# Patient Record
Sex: FEMALE | Race: BLACK OR AFRICAN AMERICAN | Employment: OTHER | URBAN - METROPOLITAN AREA
[De-identification: names, ages, dates, MRNs, and addresses within clinical notes are randomized per-mention and may not be internally consistent; named-entity substitution may affect disease eponyms.]

---

## 2019-05-07 ENCOUNTER — APPOINTMENT (OUTPATIENT)
Dept: GENERAL RADIOLOGY | Age: 69
End: 2019-05-07
Attending: ANESTHESIOLOGY
Payer: MEDICARE

## 2019-05-07 ENCOUNTER — HOSPITAL ENCOUNTER (OUTPATIENT)
Age: 69
Setting detail: OUTPATIENT SURGERY
Discharge: HOME | End: 2019-05-07
Attending: ANESTHESIOLOGY | Admitting: ANESTHESIOLOGY
Payer: MEDICARE

## 2019-05-07 VITALS
OXYGEN SATURATION: 93 % | BODY MASS INDEX: 36.48 KG/M2 | TEMPERATURE: 98.5 F | WEIGHT: 226 LBS | HEART RATE: 72 BPM | RESPIRATION RATE: 16 BRPM | SYSTOLIC BLOOD PRESSURE: 130 MMHG | DIASTOLIC BLOOD PRESSURE: 64 MMHG

## 2019-05-07 PROCEDURE — 76010000154 HC OR TIME FIRST 0.5 HR: Performed by: ANESTHESIOLOGY

## 2019-05-07 PROCEDURE — 77030018846 HC SOL IRR STRL H20 ICUM -A: Performed by: ANESTHESIOLOGY

## 2019-05-07 PROCEDURE — 74011000250 HC RX REV CODE- 250: Performed by: ANESTHESIOLOGY

## 2019-05-07 PROCEDURE — 77030014125 HC TY EPDRL BBMI -B: Performed by: ANESTHESIOLOGY

## 2019-05-07 PROCEDURE — 76210000063 HC OR PH I REC FIRST 0.5 HR: Performed by: ANESTHESIOLOGY

## 2019-05-07 PROCEDURE — 74011250636 HC RX REV CODE- 250/636

## 2019-05-07 PROCEDURE — 76210000020 HC REC RM PH II FIRST 0.5 HR: Performed by: ANESTHESIOLOGY

## 2019-05-07 PROCEDURE — 74011250636 HC RX REV CODE- 250/636: Performed by: ANESTHESIOLOGY

## 2019-05-07 PROCEDURE — 77030037529: Performed by: ANESTHESIOLOGY

## 2019-05-07 RX ORDER — FENTANYL CITRATE 50 UG/ML
INJECTION, SOLUTION INTRAMUSCULAR; INTRAVENOUS AS NEEDED
Status: DISCONTINUED | OUTPATIENT
Start: 2019-05-07 | End: 2019-05-07 | Stop reason: HOSPADM

## 2019-05-07 RX ORDER — MIDAZOLAM HYDROCHLORIDE 1 MG/ML
INJECTION, SOLUTION INTRAMUSCULAR; INTRAVENOUS AS NEEDED
Status: DISCONTINUED | OUTPATIENT
Start: 2019-05-07 | End: 2019-05-07 | Stop reason: HOSPADM

## 2019-05-07 RX ORDER — LIDOCAINE HYDROCHLORIDE 10 MG/ML
INJECTION INFILTRATION; PERINEURAL AS NEEDED
Status: DISCONTINUED | OUTPATIENT
Start: 2019-05-07 | End: 2019-05-07 | Stop reason: HOSPADM

## 2019-05-07 RX ORDER — LIDOCAINE HYDROCHLORIDE AND EPINEPHRINE 10; 10 MG/ML; UG/ML
INJECTION, SOLUTION INFILTRATION; PERINEURAL AS NEEDED
Status: DISCONTINUED | OUTPATIENT
Start: 2019-05-07 | End: 2019-05-07 | Stop reason: HOSPADM

## 2019-05-07 NOTE — BRIEF OP NOTE
BRIEF OPERATIVE NOTE Date of Procedure: 5/7/2019 Preoperative Diagnosis: Left knee pain, unspecified chronicity [M25.562] Postoperative Diagnosis: Left knee pain, unspecified chronicity [M25.562] Procedure(s): LEFT KNEE COOLIEF Surgeon(s) and Role: 
   * Rhea Scheuermann, MD - Primary Surgical Assistant: none Surgical Staff: 
Circ-1: Bora Melgar RN Local Nurse Monitor: Ness Booker RN Radiology Technician: Tommy Nguyen RT, R, CT Scrub Tech-1: Antonio Villalobos Event Time In Time Out Incision Start 9023 Incision Close 4343 Anesthesia: Local  
Estimated Blood Loss: none Specimens: * No specimens in log * Findings: none Complications: none Implants: * No implants in log *

## 2019-05-07 NOTE — H&P
[address] [place] [address] 
HISTORY AND PHYSICAL Name:  Isabel Veliz 
MR#:  [id number] :  1950 ACCOUNT #:  [de-identified] ADMIT DATE:  2019 HISTORY OF PRESENT ILLNESS:  The patient is a very pleasant 73-year-old female with severe left knee pain. She underwent diagnostic genicular nerve blocks to the left knee 2 weeks ago with excellent results. MRI shows severe tricompartmental osteoarthritis. I planned COOLIEF to the genicular nerves of her left knee tomorrow at [place] [address]. PAST MEDICAL HISTORY: 
1. Hypertension. 2.  Chronic anxiety disorder. PAST SURGICAL HISTORY: 
1. Cholecystectomy. 2.  Appendectomy. 3.  History of GI surgery. CURRENT MEDICATIONS: 
1.  Mobic. 2.  Xanax. 3.  Probiotics. 4.  Losartan. 5.  Meclizine. 6.  Vitamin D. ALLERGIES: 
1. FENTANYL. 2.  OXYCODONE. 3.  IVP DYE. 4.  REGLAN. SOCIAL HISTORY: Denies smoking. Denies alcohol abuse. FAMILY HISTORY:  Noncontributory. REVIEW OF SYSTEMS:  Noncontributory. PHYSICAL EXAMINATION: 
GENERAL APPEARANCE:  A very pleasant middle-aged female sitting in the chair, in no acute distress. Afebrile. VITAL SIGNS:  Stable. CHEST:  Clear. HEART:  Regular rate and rhythm. ABDOMEN:  Benign. EXTREMITIES:  No clubbing, cyanosis, or edema. MUSCULOSKELETAL:  Reveals some tenderness on manipulation of the left knee. NEUROLOGIC:  Exam was grossly normal. 
 
SUMMARY:  This is a very pleasant 73-year-old female with severe left knee pain who presents for COOLIEF to the genicular nerves of her left knee at [place] [address]. ASSESSMENT:  Chronic left knee pain due to severe degenerative joint disease. PLAN:  The patient will be taken to the operating room tomorrow for COOLIEF to the genicular nerves of her left knee at [place] [address]. Jaun Espinal MD 
 
 
[id number] 
D:  2019 21:12 
T:  2019 21:16 
JOB #:  [id number]

## 2019-05-07 NOTE — OP NOTES
[address] [facility]  OPERATIVE REPORT    Name:  Isabel Veliz  MR#:  [id number]  :  1950  ACCOUNT #:  [de-identified]  DATE OF SERVICE:  2019    PREOPERATIVE DIAGNOSIS:  Severe left knee pain due to degenerative joint disease. POSTOPERATIVE DIAGNOSIS:  Severe left knee pain due to degenerative joint disease. PROCEDURE PERFORMED:  1.  COOLIEF to the left superior medial genicular nerve. 2.  COOLIEF to the left superior lateral genicular nerve. 3.  COOLIEF to the left inferior medial genicular nerve. 4.  Fluoroscopy. 5.  IV sedation. SURGEON:  Timoteo Johnson MD    ASSISTANT:  none    ANESTHESIA:  Local with IV sedation. COMPLICATIONS:  None. SPECIMENS REMOVED: none    IMPLANTS:  none    ESTIMATED BLOOD LOSS:  None. FLUIDS:  None. CONDITION:  Stable. INDICATIONS:  Ms. Vanna Crockett is a wonderfully pleasant 51-year-old female with severe left knee pain. She presents today for COOLIEF to the genicular nerves of her left knee at [place] [address]. PROCEDURE:  After informed consent was obtained, a 22-gauge IV was placed in the right arm and the patient was taken to the operating room and positioned supine. Monitors were applied. Vital signs were stable. Left knee was prepped and draped in the usual sterile fashion and a small skin wheal was made over the left superior medial aspect of the knee using 1% lidocaine. Next, a 7.5 cm radiofrequency needle with a 5-mm active tip was advanced until contact was made with bone. The needle was advanced off the bone to a distance MCC the diaphysis of the distal femur. Views were confirmed in AP and lateral fluoroscopy. Sensory stimulation at 50 Hz up to 3 volts produced pain at around 1 volt. Motor stimulation at 2 Hz up to 3 volts produced no movement of the left leg or foot. Next 2 mL of 1% lidocaine were injected.   After 2 minutes, radiofrequency lesioning was carried out at 60 degrees Celsius for 2 minutes and 30 seconds. This same procedure was repeated over the left superior lateral aspect of the knee and the left inferior medial aspect of the knee. At each level, sensory stimulation at 50 Hz up to 3 volts produced pain at less than 2 volts and motor stimulation at 2 Hz up to 3 volts produced no movement of the left leg or foot. At each level, radiofrequency lesioning was carried out at 60 degrees Celsius for 2 minutes and 30 seconds. She tolerated the entire procedure well. There were no complications. All fluoroscopic views were interpreted by me. She was transferred to the recovery room in satisfactory condition. PLAN:  I recommended continuing her current pain medications. I will see her back in 4 weeks for followup in my office.       Darwin Gilbert MD      [id number]  D:  05/07/2019 9:31  T:  05/07/2019 9:36  JOB #:  [id number]

## 2019-05-07 NOTE — DISCHARGE INSTRUCTIONS
Pain Management Aftercare    Common Side Effects that may last 8-12 hours:  Drowsines  Dizziness  Poor balance     Hangover effect (headache, upset stomach, etc.)    Aftercare Instructions: You must have a responsible adult drive you home. Do not drive a car or operate equipment for at least 12 hours. Do not take any new medications for at least 24 hours unless your doctor has prescribed them and he is aware that you are taking them. Do not drink any alcoholic beverages until the next day. Advance to your normal diet as tolerated. Expect soreness at the  site that will improve over the next 24 hours. Avoid strenuous exercise or heavy lifting. Pre-injection activities may be resumed tomorrow (unless instructed otherwise by your doctor). Notify your doctor immediately if any of the following symptoms occur:  Severe pain at the site  Bleeding or drainage from site  Fever 101 degrees F or greater  New or increased weakness/numbness of extremities that does not resolve  Unusual drowsiness    Doctor's Phone Number: [phone]    Follow-up Care:  6/3/19 at 1:00 in his office      DISCHARGE SUMMARY from Nurse    PATIENT INSTRUCTIONS:    After general anesthesia or intravenous sedation, for 24 hours or while taking prescription Narcotics:  · Limit your activities  · Do not drive and operate hazardous machinery  · Do not make important personal or business decisions  · Do  not drink alcoholic beverages  · If you have not urinated within 8 hours after discharge, please contact your surgeon on call. *  Please give a list of your current medications to your Primary Care Provider. *  Please update this list whenever your medications are discontinued, doses are      changed, or new medications (including over-the-counter products) are added. *  Please carry medication information at all times in case of emergency situations.       These are general instructions for a healthy lifestyle:    No smoking/ No tobacco products/ Avoid exposure to second hand smoke    Surgeon General's Warning:  Quitting smoking now greatly reduces serious risk to your health. Obesity, smoking, and sedentary lifestyle greatly increases your risk for illness    A healthy diet, regular physical exercise & weight monitoring are important for maintaining a healthy lifestyle    You may be retaining fluid if you have a history of heart failure or if you experience any of the following symptoms:  Weight gain of 3 pounds or more overnight or 5 pounds in a week, increased swelling in our hands or feet or shortness of breath while lying flat in bed. Please call your doctor as soon as you notice any of these symptoms; do not wait until your next office visit. Recognize signs and symptoms of STROKE:    F-face looks uneven    A-arms unable to move or move unevenly    S-speech slurred or non-existent    T-time-call 911 as soon as signs and symptoms begin-DO NOT go       Back to bed or wait to see if you get better-TIME IS BRAIN.

## (undated) DEVICE — DRAPE TWL SURG 16X26IN BLU ORB04] ALLCARE INC]

## (undated) DEVICE — ELECTRODE ES AD DISPER HYDRGEL THN FOAM ADH SCALLOPED EDGE

## (undated) DEVICE — SET IV ADMIN L55IN ID0054IN EXTN MICBOR TBNG W

## (undated) DEVICE — SYR 10ML LUER LOK 1/5ML GRAD --

## (undated) DEVICE — DRAPE SHT 3 QTR PROXIMA 53X77 --

## (undated) DEVICE — SOLUTION IV STRL H2O 500 ML AQUALITE POUR BTL

## (undated) DEVICE — TRAY EPI PERIFIX CONT 17GAX3.5IN TUOHY 19GA SPRINGWOUND OPN

## (undated) DEVICE — COOLIEF* COOLED RADIOFREQUENCY PROBE KIT: Brand: COOLIEF* COOLED RADIOFREQUENCY KIT